# Patient Record
Sex: MALE | Race: WHITE
[De-identification: names, ages, dates, MRNs, and addresses within clinical notes are randomized per-mention and may not be internally consistent; named-entity substitution may affect disease eponyms.]

---

## 2019-12-19 ENCOUNTER — HOSPITAL ENCOUNTER (INPATIENT)
Dept: HOSPITAL 47 - EC | Age: 39
LOS: 7 days | Discharge: HOME | DRG: 885 | End: 2019-12-26
Attending: PSYCHIATRY & NEUROLOGY | Admitting: PSYCHIATRY & NEUROLOGY
Payer: COMMERCIAL

## 2019-12-19 DIAGNOSIS — I10: ICD-10-CM

## 2019-12-19 DIAGNOSIS — F17.200: ICD-10-CM

## 2019-12-19 DIAGNOSIS — F33.1: Primary | ICD-10-CM

## 2019-12-19 DIAGNOSIS — F10.99: ICD-10-CM

## 2019-12-19 DIAGNOSIS — Z98.52: ICD-10-CM

## 2019-12-19 DIAGNOSIS — Z81.8: ICD-10-CM

## 2019-12-19 DIAGNOSIS — E11.9: ICD-10-CM

## 2019-12-19 DIAGNOSIS — F12.99: ICD-10-CM

## 2019-12-19 DIAGNOSIS — R45.851: ICD-10-CM

## 2019-12-19 PROCEDURE — 80061 LIPID PANEL: CPT

## 2019-12-19 PROCEDURE — 85025 COMPLETE CBC W/AUTO DIFF WBC: CPT

## 2019-12-19 PROCEDURE — 80053 COMPREHEN METABOLIC PANEL: CPT

## 2019-12-19 PROCEDURE — 83036 HEMOGLOBIN GLYCOSYLATED A1C: CPT

## 2019-12-19 PROCEDURE — 80306 DRUG TEST PRSMV INSTRMNT: CPT

## 2019-12-19 PROCEDURE — 99285 EMERGENCY DEPT VISIT HI MDM: CPT

## 2019-12-19 PROCEDURE — 82248 BILIRUBIN DIRECT: CPT

## 2019-12-19 PROCEDURE — 36415 COLL VENOUS BLD VENIPUNCTURE: CPT

## 2019-12-19 PROCEDURE — 82075 ASSAY OF BREATH ETHANOL: CPT

## 2019-12-19 PROCEDURE — 84443 ASSAY THYROID STIM HORMONE: CPT

## 2019-12-20 LAB — GLUCOSE BLD-MCNC: 125 MG/DL (ref 75–99)

## 2019-12-20 RX ADMIN — NICOTINE SCH PATCH: 14 PATCH, EXTENDED RELEASE TRANSDERMAL at 09:28
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
## 2019-12-20 NOTE — P.HPMEDMHU
History of Present Illness


H&P Date: 12/20/19


Chief Complaint: MHU HPI





The patient is a 39-year-old  male with a past medical history of 

essential hypertension type 2 diabetes who is admitted to the inpatient 

psychiatry unit after being petitioned by police apparently the patient has been

reporting suicidal ideation reporting that he wanted to kill himself and was 

found to be intoxicated on arrival to the ED, after sobering up the patient 

mentioned recent separation from his wife of 18 years approximately 2 weeks ago 

and since then has been doing drugs and renting a room from her friend.  The 

patient reports being out of his metformin for the last week he does not know 

his A1c and see last saw his PCP approximately a year ago.  She denies being on 

any medications for his hypertension, he denies any chest pain, denies shortness

of breath, denies focal weakness or slurred speech, he denies any paresthesias 

numbness and tingling in his legs or any of his extremities.  Patient has no 

somatic complaints today. 


In the ER he had a UDS performed was positive for amphetamines and THC, review 

of his vital signs indicate elevated blood pressures. 





Review of Systems





Pertinent positives per HPI all other review is otherwise negative





Past Medical History


Past Medical History: Diabetes Mellitus, Hypertension


History of Any Multi-Drug Resistant Organisms: None Reported


Additional Past Surgical History / Comment(s): vasectomy


Past Psychological History: Anxiety, Depression


Smoking Status: Current every day smoker


Past Alcohol Use History: Occasional


Past Drug Use History: Marijuana, Methamphetamine





Medications and Allergies


                                Home Medications











 Medication  Instructions  Recorded  Confirmed  Type


 


ARIPiprazole [Abilify] 10 mg PO DAILY 12/20/19 12/20/19 History


 


DULoxetine HCL [Cymbalta] 60 mg PO BID 12/20/19 12/20/19 History


 


Dextroamphetamine/Amphetamine 25 mg PO QAM 12/20/19 12/20/19 History





[Adderall Xr]    


 


Dextroamphetamine/Amphetamine 10 mg PO DAILY 12/20/19 12/20/19 History





[Adderall]    


 


OXcarbazepine [Trileptal] 150 mg PO BID 12/20/19 12/20/19 History








                                    Allergies











Allergy/AdvReac Type Severity Reaction Status Date / Time


 


No Known Allergies Allergy   Verified 12/20/19 12:02














Physical Exam


Vitals: 


                                   Vital Signs











  Temp Pulse Pulse Resp BP BP Pulse Ox


 


 12/20/19 05:48  98.2 F   96  16   145/94 


 


 12/20/19 03:21   100   18  188/58   95


 


 12/19/19 21:18  98.5 F  111 H   20  135/93   100








                                Intake and Output











 12/19/19 12/20/19 12/20/19





 22:59 06:59 14:59


 


Other:   


 


  Weight 70.307 kg 68 kg 














Constitutional: No acute distress, conversant, pleasant





Eyes: Anicteric sclerae, moist conjunctiva, no lid-lag, PERRLA





ENMT: NC/AT,Oropharynx clear, no erythema, exudates





Neck:Supple, FROM, no masses, or JVD, No carotid bruits; No thyromegaly





Lungs: Clear to auscultation, Clear to percussion, Normal respiratory effort, no

 accessory muscle use 





Cardiovascular: Heart regular in rate and rhythm,  No murmurs, gallops, or rubs 

no peripheral edema





Abdominal: Soft Nontender, nom distended, no guarding, no rebound or  rigidity, 

Normoactive bowel sounds No hepatomegaly, No splenomegaly,  No palpable mass No 

abdominal wall hernia noted 





Skin: Normal temperature, tone, texture, turgor, No induration No subcutaneous 

nodules, No rash, lesions, No ulcers





Extremities:No digital cyanosis No clubbing, Pedal pulses intact and  

symmetrical Radial pulses intact and symmetrical Normal gait and station, No 

calf tenderness


 


Psychiatric: Alert and oriented to person, flat affect suicidal ideation or poor

 judgment and insight    


      


Neuro: Muscles Strength 5/5 in all 4 extremities, Sensation to light touch 

grossly present throughout, Cranial nerves II-XII grossly intact. No focal 

sensory deficits








Cranial Nerve Examination





- Cranial Nerves


Cranial Nerve II- Optic: Intact


Cranial Nerve III- Oculomotor: Intact


Cranial Nerve IV- Trochlear: Intact


Cranial Nerve V- Trigeminal: Intact


Cranial Nerve VI- Abducens: Intact


Cranial Nerve VII- Facial: Intact


Cranial Nerve VIII- Auditory: Intact


Cranial Nerve IX- Glossopharyngeal: Intact


Cranial Nerve X- Vagus: Intact


Cranial Nerve XI- Accessory: Intact


Cranial Nerve XII- Hypoglossal: Intact





Results


Labs: 


                  Abnormal Lab Results - Last 24 Hours (Table)











  12/20/19 12/20/19 Range/Units





  00:30 00:40 


 


POC Glucose (mg/dL)   125 H  (75-99)  mg/dL


 


Ur Amphetamines Screen  Detected H   (NotDetected)  


 


U Marijuana (THC) Screen  Detected H   (NotDetected)  














Assessment and Plan


Assessment: 





Uncontrolled hypertension


Type 2 diabetes


ADHD


Marijuana abuse


Depression with suicidal ideation


Adjustment disorder


Plan: 





The patient is admitted to acute inpatient psychiatry team after being 

petitioned by police with suicidal ideation, will defer to inpatient team 

regarding ongoing psychotropic and cognitive behavioral therapies.  Medically 

the patient blood pressure is elevated and uncontrolled we'll initiate him on 

lisinopril 20 mg PO daily, awaiting urinalysis, and A1c. We'll find out his 

previous metformin dose and initiate him on that therapy.  I will continue to 

follow his clinical course. 








Appreciate this consult and operation to be involved in this patient's ongoing 

care.  For further questions please not hesitate to contact the [facility] inpatient 

team
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
## 2019-12-21 LAB
ALBUMIN SERPL-MCNC: 4.5 G/DL (ref 3.5–5)
ALP SERPL-CCNC: 90 U/L (ref 38–126)
ALT SERPL-CCNC: 15 U/L (ref 4–49)
ANION GAP SERPL CALC-SCNC: 9 MMOL/L
AST SERPL-CCNC: 25 U/L (ref 17–59)
BASOPHILS # BLD AUTO: 0.1 K/UL (ref 0–0.2)
BASOPHILS NFR BLD AUTO: 1 %
BILIRUB INDIRECT SERPL-MCNC: 0.5 MG/DL (ref 0–1.1)
BILIRUBIN DIRECT+TOT PNL SERPL-MCNC: 0.2 MG/DL (ref 0–0.2)
BUN SERPL-SCNC: 11 MG/DL (ref 9–20)
CALCIUM SPEC-MCNC: 9.9 MG/DL (ref 8.4–10.2)
CHLORIDE SERPL-SCNC: 103 MMOL/L (ref 98–107)
CHOLEST SERPL-MCNC: 161 MG/DL (ref ?–200)
CO2 SERPL-SCNC: 28 MMOL/L (ref 22–30)
EOSINOPHIL # BLD AUTO: 0.2 K/UL (ref 0–0.7)
EOSINOPHIL NFR BLD AUTO: 4 %
ERYTHROCYTE [DISTWIDTH] IN BLOOD BY AUTOMATED COUNT: 5 M/UL (ref 4.3–5.9)
ERYTHROCYTE [DISTWIDTH] IN BLOOD: 12 % (ref 11.5–15.5)
GLUCOSE SERPL-MCNC: 200 MG/DL (ref 74–99)
HBA1C MFR BLD: 6.9 % (ref 4–6)
HCT VFR BLD AUTO: 48.2 % (ref 39–53)
HDLC SERPL-MCNC: 47 MG/DL (ref 40–60)
HGB BLD-MCNC: 16.4 GM/DL (ref 13–17.5)
LDLC SERPL CALC-MCNC: 78 MG/DL (ref 0–99)
LYMPHOCYTES # SPEC AUTO: 1.4 K/UL (ref 1–4.8)
LYMPHOCYTES NFR SPEC AUTO: 21 %
MCH RBC QN AUTO: 32.8 PG (ref 25–35)
MCHC RBC AUTO-ENTMCNC: 34 G/DL (ref 31–37)
MCV RBC AUTO: 96.3 FL (ref 80–100)
MONOCYTES # BLD AUTO: 0.3 K/UL (ref 0–1)
MONOCYTES NFR BLD AUTO: 5 %
NEUTROPHILS # BLD AUTO: 4.5 K/UL (ref 1.3–7.7)
NEUTROPHILS NFR BLD AUTO: 68 %
PLATELET # BLD AUTO: 299 K/UL (ref 150–450)
POTASSIUM SERPL-SCNC: 4.5 MMOL/L (ref 3.5–5.1)
PROT SERPL-MCNC: 7.2 G/DL (ref 6.3–8.2)
SODIUM SERPL-SCNC: 140 MMOL/L (ref 137–145)
TRIGL SERPL-MCNC: 182 MG/DL (ref ?–150)
WBC # BLD AUTO: 6.6 K/UL (ref 3.8–10.6)

## 2019-12-21 RX ADMIN — NICOTINE SCH PATCH: 14 PATCH, EXTENDED RELEASE TRANSDERMAL at 09:24

## 2019-12-21 RX ADMIN — ESCITALOPRAM SCH MG: 5 TABLET, FILM COATED ORAL at 13:50

## 2019-12-21 RX ADMIN — LISINOPRIL SCH MG: 20 TABLET ORAL at 09:25
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
## 2019-12-22 RX ADMIN — ESCITALOPRAM SCH MG: 5 TABLET, FILM COATED ORAL at 08:12

## 2019-12-22 RX ADMIN — NICOTINE SCH PATCH: 14 PATCH, EXTENDED RELEASE TRANSDERMAL at 08:12

## 2019-12-22 RX ADMIN — LISINOPRIL SCH MG: 20 TABLET ORAL at 08:12

## 2019-12-22 NOTE — P.PN
Progress Note - Text


Progress Note Date: 12/22/19





interval history: Patient again is seen in cross coverage today.  He talks about

wanting to be able to be discharged.  He has been talking to his wife.  He feels

like his mood is doing fine.  He slept well last night.  He does not voice any 

adverse psychotropic medication side effects.  We did talk about looking at a 

support meeting being set up for him.  He states that he would be following up 

with his outpatient psychiatrist and counselor.





Mental status exam: He is alert and cooperative with the interview.  His mood 

him she relays is doing fine.  He does not verbalize any thoughts of harm to 

self or others.  No evidence of psychosis or agitation.  His affect overall is 

restricted.  His thought processes are organized.





Plan: Patient will be maintained on current psychotropic medication regimen.  He

seems to be tolerating the Lexapro well.  Continue to monitor for any medication

side effects and monitor ongoing response to treatment.  He is inquiring 

regarding discharge planning, we did discuss him looking at setting up a support

meeting.

## 2019-12-23 RX ADMIN — NICOTINE SCH PATCH: 14 PATCH, EXTENDED RELEASE TRANSDERMAL at 08:40

## 2019-12-23 RX ADMIN — ESCITALOPRAM SCH MG: 5 TABLET, FILM COATED ORAL at 08:41

## 2019-12-23 RX ADMIN — LISINOPRIL SCH MG: 20 TABLET ORAL at 08:39

## 2019-12-23 RX ADMIN — ACETAMINOPHEN PRN MG: 325 TABLET, FILM COATED ORAL at 16:11

## 2019-12-23 NOTE — P.CON
Consult Note





- .


Consult date: 12/23/19


Assessment/Plan:: 











Chief complaint:


"I don't need to be here "





Subjective:





The patient has been seen today as follow-up, chart reviewed, case discussed 

with the treatment team. Patient slept about 6 hours last night. Patient has not

been going to groups and other unit activities. Patient reports fair appetite.  

Patient was fixated on discharge and generally minimizes psychiatric symptoms.  

He was talking about the reason for his admission because he was misunderstood 

and he never been suicidal.  Patient reports having an outpatient psychiatrist 

and willing to continue his outpatient psychiatric treatment after discharge.  

He denies suicidal or homicidal ideation and he denies feeling depressed, 

hopeless, or worthless.  He expressed feeling angry about his admission, and he 

is willing to submit 72 hours letter to be discharged. The patient is compliant 

with his medications and denies any adverse reactions. 


The patient denies any manic symptoms including sustained period of time with 

elevated or irritable mood, impulsive or irrational behavior, inflated self-

esteem, or absence need to sleep due to increases goal-directed activities.  The

patient denies any auditory or visual hallucinations.  Also the patient denies 

any paranoid ideation. 





Objective:





Vitals has been reviewed.





Mental status examination; 


Appearance: The patient appears stated age, fairly groomed, average body built, 

no specific features.


Gait/posture: Normal gait, Normal arm  swinging: No abnormal movements.


Attitude and behavior: Not fully engaged, not fully cooperative, interrupted eye

contact.


Motor activity: Normal psychomotor activity


Speech: Normal rate, volume, not pressured


Mood: Anxious, irritable


Affect: Restricted


Thought form: goal-directed, linear, coherent.


Thought content: Non-delusional, denies suicidal thoughts, denies homicidal 

thoughts, denies intentions or plans. 


Perception: Denies any auditory or visual hallucinations


Attention: No impairment.  


Orientation: Patient patient was oriented to time place person and situation.


Insight: Patient has limited insight about   psychiatric disorder.


Judgment: Patient has limited judgment about his psychiatric treatment.








Assessment:


Major depressive disorder, recurrent, moderate.


History of adult attention deficit disorder.


Cannabis use disorder, mild.


Alcohol use disorder, mild.








Plan:


Continue inpatient level of care due to need for further stabilization on 

medications


Precautions: Continue 15 minutes check for safety.


Consider medical consultation if any acute medical issues arise.


Provide the patient individual, group therapy, substance use disorder counseling

to give better insight and learn coping skills.





Medications:


Continue Abilify 10 mg daily for mood stabilization.


Continue Trileptal 150 mg twice daily for mood stabilization.


Continue Lexapro 5 mg daily for depression and anxiety.  Patient was on Cymbalta

60 mg twice daily before this admission and he didn't benefit from the maximum 

dose so he was a switch to Lexapro.





Discharge patient to OUTPATIENT services upon a stabilization





Expected LOS: 1-2 days

## 2019-12-24 RX ADMIN — ACETAMINOPHEN PRN MG: 325 TABLET, FILM COATED ORAL at 18:07

## 2019-12-24 RX ADMIN — LISINOPRIL SCH MG: 20 TABLET ORAL at 09:01

## 2019-12-24 RX ADMIN — NICOTINE SCH PATCH: 14 PATCH, EXTENDED RELEASE TRANSDERMAL at 09:00

## 2019-12-24 RX ADMIN — ESCITALOPRAM SCH MG: 5 TABLET, FILM COATED ORAL at 09:01

## 2019-12-24 NOTE — P.PN
Progress Note - Text


Progress Note Date: 12/24/19





Chief complaint:


"I want to go home "





Subjective:





The patient has been seen today as follow-up, chart reviewed, case discussed 

with the treatment team. Patient slept about 7 hours last night, and reports 

fair appetite.  Patient reports need to be discharged and denies feeling 

depressed, hopeless, worthless, or suicidal.  He reports plan to continue 

psychiatric treatment after discharge with his outpatient psychiatrist.  Patient

reports willing to get back with his wife after discharge and if she refused him

back home he would find another place to stay.  According to the nursing staff, 

patient's wife called the unit and he reported that he is not welcome going back

home.


Patient denies any manic or psychotic symptoms, and he continued to report that 

dwelling to attend group therapy because "I have been through this groups many 

times before."  The patient is compliant with his medications and denies any 

adverse reactions. 








Objective:





Vitals has been reviewed.





Mental status examination; 


Appearance: The patient appears stated age, fairly groomed, average body built, 

no specific features.


Gait/posture: Normal gait, Normal arm  swinging: No abnormal movements.


Attitude and behavior: engaged,  cooperative, fair eye contact.


Motor activity: Normal psychomotor activity


Speech: Normal rate, volume, not pressured


Mood: Anxious


Affect: Restricted


Thought form: goal-directed, linear, coherent.


Thought content: Non-delusional, denies suicidal thoughts, denies homicidal 

thoughts, denies intentions or plans. 


Perception: Denies any auditory or visual hallucinations


Attention: No impairment.  


Orientation: Patient patient was oriented to time place person and situation.


Insight: Patient has fair insight about   psychiatric disorder.


Judgment: Patient has fair judgment about his psychiatric treatment.








Assessment:


Major depressive disorder, recurrent, moderate.


History of adult attention deficit disorder.


Cannabis use disorder, mild.


Alcohol use disorder, mild.








Plan:


Continue inpatient level of care due to need for further stabilization on 

medications and for discharge planning


Precautions: Continue 15 minutes check for safety.


Consider medical consultation if any acute medical issues arise.


Provide the patient individual, group therapy, substance use disorder counseling

to give better insight and learn coping skills.





Medications:


Continue Abilify 10 mg daily for mood stabilization.


Continue Trileptal 150 mg twice daily for mood stabilization.


Continue Lexapro 5 mg daily for depression and anxiety.  Patient was on Cymbalta

60 mg twice daily before this admission and he didn't benefit from the maximum 

dose so he was a switch to Lexapro.





Discharge patient to OUTPATIENT services upon a stabilization





Expected LOS: 1-2 days

## 2019-12-25 VITALS — RESPIRATION RATE: 16 BRPM

## 2019-12-25 RX ADMIN — LISINOPRIL SCH MG: 20 TABLET ORAL at 10:20

## 2019-12-25 RX ADMIN — ESCITALOPRAM SCH MG: 5 TABLET, FILM COATED ORAL at 10:19

## 2019-12-25 RX ADMIN — NICOTINE SCH PATCH: 14 PATCH, EXTENDED RELEASE TRANSDERMAL at 10:20

## 2019-12-25 NOTE — P.PN
Progress Note - Text


Progress Note Date: 12/25/19





Chief complaint:


"I'm waiting to be discharged "





Subjective:





The patient has been seen today as follow-up, chart reviewed, case discussed 

with the treatment team.  Patient reports good sleep last night and he denies 

any appetite problems.  Patient continued to be fixated on discharge, and he c

ontinued to deny any symptoms of depression, anxiety, or severe mood swings.  

Patient reports feeling stable emotionally and denies feeling depressed, 

hopeless, worthless, or suicidal.  He denies any severe anxiety, racing 

thoughts, mood swings, irritability, or agitation.  Patient denies any manic or 

psychotic symptoms.  He continued to take his medications and he denies any side

effects.


Patient reports not wanting to attend groups because he has been in groups 

before and he doesn't feel any benefit from going to groups.








Objective:





Vitals has been reviewed.





Mental status examination; 


Appearance: The patient appears stated age, fairly groomed, average body built, 

no specific features.


Gait/posture: Normal gait, Normal arm  swinging: No abnormal movements.


Attitude and behavior: engaged,  cooperative, fair eye contact.


Motor activity: Normal psychomotor activity


Speech: Normal rate, volume, not pressured


Mood: Anxious


Affect: Restricted


Thought form: goal-directed, linear, coherent.


Thought content: Non-delusional, denies suicidal thoughts, denies homicidal 

thoughts, denies intentions or plans. 


Perception: Denies any auditory or visual hallucinations


Attention: No impairment.  


Orientation: Patient patient was oriented to time place person and situation.


Insight: Patient has fair insight about   psychiatric disorder.


Judgment: Patient has fair judgment about his psychiatric treatment.








Assessment:


Major depressive disorder, recurrent, moderate.


History of adult attention deficit disorder.


Cannabis use disorder, mild.


Alcohol use disorder, mild.








Plan:


Continue inpatient level of care due to need for further stabilization on 

medications and for discharge planning


Precautions: Continue 15 minutes check for safety.


Consider medical consultation if any acute medical issues arise.


Provide the patient individual, group therapy, substance use disorder counseling

to give better insight and learn coping skills.





Medications:


Continue Abilify 10 mg daily for mood stabilization.


Continue Trileptal 150 mg twice daily for mood stabilization.


Continue Lexapro 5 mg daily for depression and anxiety.  Patient was on Cymbalta

60 mg twice daily before this admission and he didn't benefit from the maximum 

dose so he was a switch to Lexapro.





Discharge patient to OUTPATIENT services upon a stabilization





Expected LOS: Plan for discharge tomorrow

## 2019-12-26 VITALS — DIASTOLIC BLOOD PRESSURE: 64 MMHG | SYSTOLIC BLOOD PRESSURE: 106 MMHG | HEART RATE: 64 BPM | TEMPERATURE: 98.7 F

## 2019-12-26 RX ADMIN — ESCITALOPRAM SCH MG: 5 TABLET, FILM COATED ORAL at 08:39

## 2019-12-26 RX ADMIN — LISINOPRIL SCH MG: 20 TABLET ORAL at 08:39

## 2019-12-26 RX ADMIN — NICOTINE SCH PATCH: 14 PATCH, EXTENDED RELEASE TRANSDERMAL at 08:39
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
